# Patient Record
Sex: FEMALE | Race: WHITE
[De-identification: names, ages, dates, MRNs, and addresses within clinical notes are randomized per-mention and may not be internally consistent; named-entity substitution may affect disease eponyms.]

---

## 2018-05-28 ENCOUNTER — HOSPITAL ENCOUNTER (EMERGENCY)
Dept: HOSPITAL 56 - MW.ED | Age: 43
Discharge: HOME | End: 2018-05-28
Payer: COMMERCIAL

## 2018-05-28 DIAGNOSIS — J02.9: Primary | ICD-10-CM

## 2018-05-28 DIAGNOSIS — K12.0: ICD-10-CM

## 2018-05-28 PROCEDURE — 87081 CULTURE SCREEN ONLY: CPT

## 2018-05-28 PROCEDURE — 99283 EMERGENCY DEPT VISIT LOW MDM: CPT

## 2018-05-28 PROCEDURE — 87880 STREP A ASSAY W/OPTIC: CPT

## 2018-05-28 NOTE — EDM.PDOC
ED HPI GENERAL MEDICAL PROBLEM





- General


Chief Complaint: ENT Problem


Stated Complaint: MOUTH SORES


Time Seen by Provider: 05/28/18 10:38


Source of Information: Reports: Patient, RN Notes Reviewed


History Limitations: Reports: No Limitations





- History of Present Illness


INITIAL COMMENTS - FREE TEXT/NARRATIVE: 





HISTORY AND PHYSICAL:


[]43-year-old female who is complains of her throat





History of Present Illness:


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


She has had strep multiple times in the past and states this is what her throat 

feels like


SHe is also complaining of multiple canker sores to her inner lip/has used 

multiple products over-the-counter without any relief





Review of Systems:


As per history of present illness and below otherwise all 


systems reviewed and negative.  





Past medical history:


As per history of present illness and as reviewed below


otherwise noncontributory.





Surgical history:


As per history of present illness and as reviewed below


otherwise noncontributory.





Social history:


No reported history of drug or alcohol abuse.





Family history:


As per history of present illness and as reviewed below


otherwise noncontributory.





Physical exam:


Oriented female acting age-appropriate and answering questions appropriately 

she is speaking in full sentences without any shortness of breath she is 

nontoxic in her appearance


Warm and dry.


HEENT: Atraumatic, normocehpalic, pupils reactive, negative for conjunctival 

pallor or scleral icterus, mucous membranes moist, throat clear, neck supple, 

nontender, trachea midline.  


Lungs: Clear to auscultation, breath sounds equal bilaterally, chest non 

tender.  


Heart: S1S2, regular, negative for clicks, rubs, or JVD.


Abdomen: Soft, nondistended, nontender.  Negative for masses or 

hepatossplenmegaly. Negative for costovertebral tenderness.


Pelvis: Stable nontender.


Genitourinary: Deferred.


Rectal: Deferred


Extremities: Atraumatic, negative for cords or calf pain.  


Neurovascular unremarkable.


Neuro:  Awake, alert, oriented.  Cranial nerves II through XII


unremarkable.  Cerebellum unremarkable.  Motor and sensory unremarkable 

throughout.  Exam nonfocal.  





Diagnostics:


[]rapid strep





Therapeutics:


[]





Impression:


[]pharyngitis


canker sores





Plan:


[]augmentin


mylanta or riopan for canker sores rinse and spit prn





Definitive disposition and diagnosis as appropriate pending


reevaluation and review of above.  





Onset: Gradual


Duration: Day(s):


Location: Reports: Face, Neck


Quality: Reports: Ache


Severity: Moderate


Improves with: Reports: None


Worsens with: Reports: None


Associated Symptoms: Reports: No Other Symptoms





- Related Data


 Allergies











Allergy/AdvReac Type Severity Reaction Status Date / Time


 


No Known Allergies Allergy   Verified 05/28/18 10:42











Home Meds: 


 Home Meds





Amoxicillin/Potassium Clav [Augmentin 875-125 Tablet] 1 each PO BID #20 tablet 

05/28/18 [Rx]











ED ROS ENT





- Review of Systems


Review Of Systems: ROS reveals no pertinent complaints other than HPI.





ED EXAM, ENT





- Physical Exam


Exam: See Below (see dictation)





Course





- Vital Signs


Last Recorded V/S: 


 Last Vital Signs











Temp  36.1 C   05/28/18 10:40


 


Pulse  89   05/28/18 10:40


 


Resp  20   05/28/18 10:40


 


BP  121/73   05/28/18 10:40


 


Pulse Ox  100   05/28/18 10:40














- Orders/Labs/Meds


Orders: 


 Active Orders 24 hr











 Category Date Time Status


 


 CULTURE STREP A CONFIRMATION [RM] Stat Lab  05/28/18 11:33 Results


 


 STREP SCRN A RAPID W CULT CONF [RM] Stat Lab  05/28/18 11:33 Ordered











Meds: 


Medications














Discontinued Medications














Generic Name Dose Route Start Last Admin





  Trade Name Shagufta  PRN Reason Stop Dose Admin


 


Amoxicillin/Clavulanate Potassium  1 tab  05/28/18 12:59  05/28/18 13:07





  Augmentin 875 Mg/125 Mg  PO  05/28/18 13:00  1 tab





  ONETIME ONE   Administration





     





     





     





     


 


Benzocaine  2 each  05/28/18 12:59  05/28/18 13:07





  Hurricaine One 20%  MUCMEM  05/28/18 13:00  2 each





  ONETIME ONE   Administration





     





     





     





     


 


Lidocaine HCl  15 ml  05/28/18 12:59  05/28/18 13:07





  Xylocaine 2% Viscous  PO  05/28/18 13:00  15 ml





  ONETIME ONE   Administration





     





     





     





     














Departure





- Departure


Time of Disposition: 13:21


Disposition: Home, Self-Care 01


Condition: Good


Clinical Impression: 


 Canker sores oral





Pharyngitis


Qualifiers:


 Pharyngitis/tonsillitis etiology: unspecified etiology Qualified Code(s): 

J02.9 - Acute pharyngitis, unspecified








- Discharge Information


Prescriptions: 


Amoxicillin/Potassium Clav [Augmentin 875-125 Tablet] 1 each PO BID #20 tablet


Referrals: 


Ray Herrmann MD [Primary Care Provider] - 


Forms:  ED Department Discharge


Additional Instructions: 


The following information is given to patients seen in the emergency department 

who are being discharged to home. This information is to outline your options 

for follow-up care. We provide all patients seen in our emergency department 

with a follow-up referral.





The need for follow-up, as well as the timing and circumstances, are variable 

depending upon the specifics of your emergency department visit.





If you don't have a primary care physician on staff, we will provide you with a 

referral. We always advise you to contact your personal physician following an 

emergency department visit to inform them of the circumstance of the visit and 

for follow-up with them and/or the need for any referrals to a consulting 

specialist.





The emergency department will also refer you to a specialist when appropriate. 

This referral assures that you have the opportunity for followup care with a 

specialist. All of these measure are taken in an effort to provide you with 

optimal care, which includes your followup.





Under all circumstances we always encourage you to contact your private 

physician who remains a resource for coordinating  your care. When calling for 

followup care, please make the office aware that this follow-up is from your 

recent emergency room visit. If for any reason you are refused follow-up, 

please contact the [facility] emergency department at [phone] 

and asked to speak to the emergency department charge nurse.





Because of the length of time he had this sore throat will treat with Augmentin


Prescription has been sent to your pharmacy


Dental balls will help with the discomfort you're having from the canker sores 

in her mouth


EScription for Glenny's medical mouthwash was given





- My Orders


Last 24 Hours: 


My Active Orders





05/28/18 11:33


CULTURE STREP A CONFIRMATION [RM] Stat 


STREP SCRN A RAPID W CULT CONF [RM] Stat 














- Assessment/Plan


Last 24 Hours: 


My Active Orders





05/28/18 11:33


CULTURE STREP A CONFIRMATION [RM] Stat 


STREP SCRN A RAPID W CULT CONF [RM] Stat